# Patient Record
Sex: FEMALE | Race: WHITE | NOT HISPANIC OR LATINO | URBAN - METROPOLITAN AREA
[De-identification: names, ages, dates, MRNs, and addresses within clinical notes are randomized per-mention and may not be internally consistent; named-entity substitution may affect disease eponyms.]

---

## 2020-07-25 ENCOUNTER — TELEPHONE ENCOUNTER (OUTPATIENT)
Dept: URBAN - METROPOLITAN AREA CLINIC 13 | Facility: CLINIC | Age: 70
End: 2020-07-25

## 2020-07-25 RX ORDER — SUCRALFATE 1 G/1
TAKE 1 TABLET 4 TIMES DAILY BEFORE MEALS AND AT BEDTIME TABLET ORAL
Qty: 120 | Refills: 1 | OUTPATIENT
Start: 2012-05-16 | End: 2015-03-04

## 2020-07-25 RX ORDER — BUTALBITAL, ACETAMINOPHEN, AND CAFFEINE 50; 325; 40 MG/1; MG/1; MG/1
TAKE 1 TABLET 3 TIMES DAILY AS NEEDED FOR HEADACHE TABLET ORAL
Qty: 60 | Refills: 0 | OUTPATIENT
Start: 2011-10-05 | End: 2020-02-27

## 2020-07-25 RX ORDER — RAMIPRIL 5 MG/1
TAKE 1 CAPSULE ONCE DAILY CAPSULE ORAL
Refills: 0 | OUTPATIENT
Start: 2011-10-05 | End: 2015-03-04

## 2020-07-25 RX ORDER — ALLOPURINOL 300 MG/1
TAKE 1 TABLET DAILY TABLET ORAL
Refills: 0 | OUTPATIENT
Start: 2010-11-11 | End: 2015-03-04

## 2020-07-25 RX ORDER — POLYETHYLENE GLYCOL 3350, SODIUM SULFATE, SODIUM CHLORIDE, POTASSIUM CHLORIDE, ASCORBIC ACID, SODIUM ASCORBATE 7.5-2.691G
USE AS DIRECTED KIT ORAL
Qty: 1 | Refills: 0 | OUTPATIENT
Start: 2012-03-16 | End: 2012-04-02

## 2020-07-25 RX ORDER — AZITHROMYCIN DIHYDRATE 250 MG/1
TABLET, FILM COATED ORAL
Qty: 6 | Refills: 0 | OUTPATIENT
Start: 2012-03-14 | End: 2012-04-02

## 2020-07-25 RX ORDER — ESTRADIOL 1 MG/1
TAKE 1 TABLET DAILY TABLET ORAL
Refills: 0 | OUTPATIENT
Start: 2010-11-11 | End: 2020-01-28

## 2020-07-25 RX ORDER — METOPROLOL TARTRATE 50 MG/1
TAKE 1 TABLET EVERY 12 HOURS DAILY TABLET ORAL
Refills: 0 | OUTPATIENT
End: 2020-01-28

## 2020-07-25 RX ORDER — DEXLANSOPRAZOLE 60 MG/1
TAKE 1 CAPSULE DAILY CAPSULE, DELAYED RELEASE ORAL
Refills: 0 | OUTPATIENT
Start: 2012-02-13 | End: 2015-03-04

## 2020-07-25 RX ORDER — PIOGLITAZONE AND METFORMIN HYDROCHLORIDE 15; 850 MG/1; MG/1
TAKE 1 TABLET TWICE DAILY TABLET, FILM COATED ORAL
Refills: 0 | OUTPATIENT
Start: 2010-11-11 | End: 2015-03-04

## 2020-07-25 RX ORDER — FERROUS SULFATE 325(65) MG
TAKE 1 TABLET TWICE DAILY TABLET ORAL
Refills: 0 | OUTPATIENT
End: 2015-03-04

## 2020-07-25 RX ORDER — PRAVASTATIN SODIUM 20 MG/1
TAKE 2 TABLET DAILY TABLET ORAL
Refills: 0 | OUTPATIENT
Start: 2012-03-14 | End: 2020-01-28

## 2020-07-25 RX ORDER — SITAGLIPTIN PHOSPHATE 100 MG
TAKE 1 TABLET DAILY TABLET ORAL
Refills: 0 | OUTPATIENT
Start: 2010-11-11 | End: 2012-04-19

## 2020-07-26 ENCOUNTER — TELEPHONE ENCOUNTER (OUTPATIENT)
Dept: URBAN - METROPOLITAN AREA CLINIC 13 | Facility: CLINIC | Age: 70
End: 2020-07-26

## 2020-07-26 RX ORDER — INSULIN GLARGINE 100 [IU]/ML
INJ 35 UNITS MORNING AND 25 UNITS BEDTIME INJECTION, SOLUTION SUBCUTANEOUS
Qty: 54 | Refills: 0 | Status: ACTIVE | COMMUNITY
Start: 2019-06-18

## 2020-07-26 RX ORDER — TAMOXIFEN CITRATE 10 MG/1
TAKE 1 2 (ONE HALF) TABLET BY MOUTH ONCE DAILY TABLET, FILM COATED ORAL
Qty: 45 | Refills: 0 | Status: ACTIVE | COMMUNITY
Start: 2019-04-19

## 2020-07-26 RX ORDER — LISINOPRIL 10 MG/1
TAKE 1 TABLET BY MOUTH EVERY DAY TABLET ORAL
Qty: 30 | Refills: 0 | Status: ACTIVE | COMMUNITY
Start: 2019-04-17

## 2020-07-26 RX ORDER — ESTERIFIED ESTROGENS AND METHYLTESTOSTERONE 1.25; 2.5 MG/1; MG/1
TABLET ORAL
Qty: 90 | Refills: 0 | Status: ACTIVE | COMMUNITY
Start: 2011-10-05

## 2020-07-26 RX ORDER — FENOFIBRATE 54 MG/1
1 TABLET WITH A MEAL ONCE A DAY ORALLY 30 TABLET ORAL
Qty: 30 | Refills: 0 | Status: ACTIVE | COMMUNITY
Start: 2019-02-13

## 2020-07-26 RX ORDER — DOCUSATE SODIUM 100 MG/1
TAKE 1 CAPSULE BY MOUTH DAILY CAPSULE, LIQUID FILLED ORAL
Qty: 10 | Refills: 0 | Status: ACTIVE | COMMUNITY
Start: 2019-02-20

## 2020-07-26 RX ORDER — ROSUVASTATIN CALCIUM 10 MG/1
TAKE 1 TABLET BY MOUTH EVERY DAY TABLET, FILM COATED ORAL
Qty: 90 | Refills: 0 | Status: ACTIVE | COMMUNITY
Start: 2019-01-16

## 2020-07-26 RX ORDER — DULAGLUTIDE 0.75 MG/.5ML
INJECT 1 PEN ONCE PER WEEK INJECTION, SOLUTION SUBCUTANEOUS
Qty: 4 | Refills: 0 | Status: ACTIVE | COMMUNITY
Start: 2019-06-11

## 2020-07-26 RX ORDER — MECLIZINE HYDROCHLORIDE 25 MG/1
TAKE 1 TABLET EVERY 8 TO 12 HOURS AS NEEDED TABLET ORAL
Qty: 45 | Refills: 1 | Status: ACTIVE | COMMUNITY
Start: 2015-04-03

## 2020-07-26 RX ORDER — DESVENLAFAXINE SUCCINATE 50 MG
TABLET, EXTENDED RELEASE 24 HR ORAL
Qty: 30 | Refills: 0 | Status: ACTIVE | COMMUNITY
Start: 2011-09-21

## 2020-07-26 RX ORDER — SODIUM BICARBONATE 650 MG/1
TK 1 T PO  BID TABLET ORAL
Qty: 60 | Refills: 0 | Status: ACTIVE | COMMUNITY
Start: 2019-11-06

## 2020-07-26 RX ORDER — CITALOPRAM 20 MG/1
TAKE ONE TABLET BY MOUTH EVERY DAY AS DIRECTED TABLET ORAL
Refills: 0 | Status: ACTIVE | COMMUNITY
Start: 2019-03-15

## 2020-07-26 RX ORDER — ONDANSETRON HYDROCHLORIDE 4 MG/1
TAKE 1 TABLET BY MOUTH EVERY 8 HOURS AS NEEDED TABLET, FILM COATED ORAL
Qty: 10 | Refills: 0 | Status: ACTIVE | COMMUNITY
Start: 2019-02-20

## 2020-07-26 RX ORDER — ALLOPURINOL 100 MG/1
TAKE 1 TABLET DAILY TABLET ORAL
Refills: 0 | Status: ACTIVE | COMMUNITY

## 2020-07-26 RX ORDER — INSULIN LISPRO 200 [IU]/ML
INJECT 15 UNITS BEFORE MEALS AND AS NEEDED UP TO 60 UNITS PER DAY INJECTION, SOLUTION SUBCUTANEOUS
Qty: 30 | Refills: 0 | Status: ACTIVE | COMMUNITY
Start: 2018-05-21

## 2020-07-26 RX ORDER — GLIMEPIRIDE 2 MG/1
TAKE 1 TABLET TWICE DAILY TABLET ORAL
Refills: 0 | Status: ACTIVE | COMMUNITY
Start: 2010-11-11

## 2020-07-26 RX ORDER — INSULIN GLARGINE 100 [IU]/ML
INJECT 30 UNITS IN THE MORNING AND 15 UNITS BEFORE BEDTIME INJECTION, SOLUTION SUBCUTANEOUS
Qty: 45 | Refills: 0 | Status: ACTIVE | COMMUNITY
Start: 2018-05-21

## 2020-07-26 RX ORDER — FLUCONAZOLE 150 MG/1
TABLET ORAL
Qty: 3 | Refills: 0 | Status: ACTIVE | COMMUNITY
Start: 2011-06-14

## 2020-07-26 RX ORDER — MUPIROCIN 20 MG/G
APPLY TO AFFECTED AREA 3 TIMES A DAY FOR 5 DAYS OINTMENT TOPICAL
Qty: 22 | Refills: 0 | Status: ACTIVE | COMMUNITY
Start: 2019-01-24

## 2020-07-26 RX ORDER — OXYCODONE AND ACETAMINOPHEN 5; 325 MG/1; MG/1
TAKE 1 TABLET BY MOUTH EVERY 4 HOURS AS NEEDED TABLET ORAL
Qty: 15 | Refills: 0 | Status: ACTIVE | COMMUNITY
Start: 2019-02-20

## 2020-07-26 RX ORDER — DULAGLUTIDE 0.75 MG/.5ML
1 (ONE) SOLUTION PEN-INJECTOR ONCE A WEEK INJECTION, SOLUTION SUBCUTANEOUS
Qty: 4 | Refills: 0 | Status: ACTIVE | COMMUNITY
Start: 2019-04-17

## 2020-07-26 RX ORDER — ESOMEPRAZOLE MAGNESIUM 40 MG
TAKE 1 CAPSULE DAILY CAPSULE,DELAYED RELEASE (ENTERIC COATED) ORAL
Refills: 0 | Status: ACTIVE | COMMUNITY

## 2020-07-26 RX ORDER — HYDROXYZINE HYDROCHLORIDE 25 MG/1
1 TABLET AS NEEDED FOR ANXIETY AND AT NIGHT FOR SLEEP EVERY 8 HOURS TABLET, FILM COATED ORAL
Qty: 90 | Refills: 0 | Status: ACTIVE | COMMUNITY
Start: 2019-04-18

## 2020-07-26 RX ORDER — TIZANIDINE HYDROCHLORIDE 4 MG/1
TAKE 1 CAPSULE BY MOUTH AT BEDTIME CAPSULE ORAL
Qty: 90 | Refills: 0 | Status: ACTIVE | COMMUNITY
Start: 2019-11-20

## 2020-07-26 RX ORDER — INSULIN ASPART 100 [IU]/ML
INJECT 13 UNIT 3 TIMES DAILY INJECTION, SOLUTION INTRAVENOUS; SUBCUTANEOUS
Refills: 0 | Status: ACTIVE | COMMUNITY

## 2020-07-26 RX ORDER — ALENDRONATE SODIUM 70 MG/1
TAKE 1 TABLET BY MOUTH WEEKLY TABLET ORAL
Qty: 4 | Refills: 0 | Status: ACTIVE | COMMUNITY
Start: 2019-02-12

## 2020-07-26 RX ORDER — NYSTATIN 100000 [USP'U]/G
APPLY TO AFFECTED AREA TWICE A DAY FOR 10 DAYS CREAM TOPICAL
Qty: 15 | Refills: 0 | Status: ACTIVE | COMMUNITY
Start: 2019-04-08

## 2023-12-27 ENCOUNTER — LAB OUTSIDE AN ENCOUNTER (OUTPATIENT)
Dept: URBAN - METROPOLITAN AREA CLINIC 113 | Facility: CLINIC | Age: 73
End: 2023-12-27

## 2023-12-27 ENCOUNTER — OFFICE VISIT (OUTPATIENT)
Dept: URBAN - METROPOLITAN AREA CLINIC 113 | Facility: CLINIC | Age: 73
End: 2023-12-27
Payer: COMMERCIAL

## 2023-12-27 ENCOUNTER — DASHBOARD ENCOUNTERS (OUTPATIENT)
Age: 73
End: 2023-12-27

## 2023-12-27 VITALS
BODY MASS INDEX: 37.91 KG/M2 | RESPIRATION RATE: 16 BRPM | HEART RATE: 80 BPM | WEIGHT: 206 LBS | TEMPERATURE: 97.4 F | HEIGHT: 62 IN | SYSTOLIC BLOOD PRESSURE: 177 MMHG | DIASTOLIC BLOOD PRESSURE: 82 MMHG

## 2023-12-27 DIAGNOSIS — R79.89 ELEVATED LFTS: ICD-10-CM

## 2023-12-27 DIAGNOSIS — R19.5 POSITIVE COLORECTAL CANCER SCREENING USING COLOGUARD TEST: ICD-10-CM

## 2023-12-27 PROCEDURE — 99204 OFFICE O/P NEW MOD 45 MIN: CPT | Performed by: INTERNAL MEDICINE

## 2023-12-27 RX ORDER — ONDANSETRON HYDROCHLORIDE 4 MG/1
TAKE 1 TABLET BY MOUTH EVERY 8 HOURS AS NEEDED TABLET, FILM COATED ORAL
Qty: 10 | Refills: 0 | Status: ACTIVE | COMMUNITY
Start: 2019-02-20

## 2023-12-27 RX ORDER — FLUCONAZOLE 150 MG/1
TABLET ORAL
Qty: 3 | Refills: 0 | Status: ACTIVE | COMMUNITY
Start: 2011-06-14

## 2023-12-27 RX ORDER — INSULIN GLARGINE 100 [IU]/ML
INJECT 30 UNITS IN THE MORNING AND 15 UNITS BEFORE BEDTIME INJECTION, SOLUTION SUBCUTANEOUS
Qty: 45 | Refills: 0 | Status: ACTIVE | COMMUNITY
Start: 2018-05-21

## 2023-12-27 RX ORDER — ROSUVASTATIN CALCIUM 10 MG/1
TAKE 1 TABLET BY MOUTH EVERY DAY TABLET, FILM COATED ORAL
Qty: 90 | Refills: 0 | Status: ON HOLD | COMMUNITY
Start: 2019-01-16

## 2023-12-27 RX ORDER — ESTERIFIED ESTROGENS AND METHYLTESTOSTERONE 1.25; 2.5 MG/1; MG/1
TABLET ORAL
Qty: 90 | Refills: 0 | Status: ON HOLD | COMMUNITY
Start: 2011-10-05

## 2023-12-27 RX ORDER — DESVENLAFAXINE SUCCINATE 50 MG
TABLET, EXTENDED RELEASE 24 HR ORAL
Qty: 30 | Refills: 0 | Status: ON HOLD | COMMUNITY
Start: 2011-09-21

## 2023-12-27 RX ORDER — PANTOPRAZOLE SODIUM 20 MG/1
1 TABLET TABLET, DELAYED RELEASE ORAL ONCE A DAY
Status: ACTIVE | COMMUNITY

## 2023-12-27 RX ORDER — TIZANIDINE HYDROCHLORIDE 4 MG/1
TAKE 1 CAPSULE BY MOUTH AT BEDTIME CAPSULE ORAL
Qty: 90 | Refills: 0 | Status: ACTIVE | COMMUNITY
Start: 2019-11-20

## 2023-12-27 RX ORDER — FLUOXETINE HYDROCHLORIDE 10 MG/1
1 CAPSULE CAPSULE ORAL ONCE A DAY
Status: ACTIVE | COMMUNITY

## 2023-12-27 RX ORDER — MUPIROCIN 20 MG/G
APPLY TO AFFECTED AREA 3 TIMES A DAY FOR 5 DAYS OINTMENT TOPICAL
Qty: 22 | Refills: 0 | Status: ACTIVE | COMMUNITY
Start: 2019-01-24

## 2023-12-27 RX ORDER — SODIUM BICARBONATE 650 MG/1
TK 1 T PO  BID TABLET ORAL
Qty: 60 | Refills: 0 | Status: ACTIVE | COMMUNITY
Start: 2019-11-06

## 2023-12-27 RX ORDER — ALLOPURINOL 100 MG/1
TAKE 1 TABLET DAILY TABLET ORAL
Refills: 0 | Status: ACTIVE | COMMUNITY

## 2023-12-27 RX ORDER — INSULIN LISPRO 200 [IU]/ML
INJECT 15 UNITS BEFORE MEALS AND AS NEEDED UP TO 60 UNITS PER DAY INJECTION, SOLUTION SUBCUTANEOUS
Qty: 30 | Refills: 0 | Status: ACTIVE | COMMUNITY
Start: 2018-05-21

## 2023-12-27 RX ORDER — CITALOPRAM 20 MG/1
TAKE ONE TABLET BY MOUTH EVERY DAY AS DIRECTED TABLET ORAL
Refills: 0 | Status: ON HOLD | COMMUNITY
Start: 2019-03-15

## 2023-12-27 RX ORDER — BUTALBITAL AND ACETAMINOPHEN 50; 325 MG/1; MG/1
1 TABLET AS NEEDED TABLET ORAL
Status: ACTIVE | COMMUNITY

## 2023-12-27 RX ORDER — TAMOXIFEN CITRATE 10 MG/1
TAKE 1 2 (ONE HALF) TABLET BY MOUTH ONCE DAILY TABLET, FILM COATED ORAL
Qty: 45 | Refills: 0 | Status: ON HOLD | COMMUNITY
Start: 2019-04-19

## 2023-12-27 RX ORDER — DOCUSATE SODIUM 100 MG/1
TAKE 1 CAPSULE BY MOUTH DAILY CAPSULE, LIQUID FILLED ORAL
Qty: 10 | Refills: 0 | Status: ACTIVE | COMMUNITY
Start: 2019-02-20

## 2023-12-27 RX ORDER — GLIMEPIRIDE 2 MG/1
TAKE 1 TABLET TWICE DAILY TABLET ORAL
Refills: 0 | Status: ACTIVE | COMMUNITY
Start: 2010-11-11

## 2023-12-27 RX ORDER — NYSTATIN 100000 [USP'U]/G
APPLY TO AFFECTED AREA TWICE A DAY FOR 10 DAYS CREAM TOPICAL
Qty: 15 | Refills: 0 | Status: ACTIVE | COMMUNITY
Start: 2019-04-08

## 2023-12-27 RX ORDER — MECLIZINE HYDROCHLORIDE 25 MG/1
TAKE 1 TABLET EVERY 8 TO 12 HOURS AS NEEDED TABLET ORAL
Qty: 45 | Refills: 1 | Status: ACTIVE | COMMUNITY
Start: 2015-04-03

## 2023-12-27 RX ORDER — INSULIN ASPART 100 [IU]/ML
INJECT 13 UNIT 3 TIMES DAILY INJECTION, SOLUTION INTRAVENOUS; SUBCUTANEOUS
Refills: 0 | Status: ON HOLD | COMMUNITY

## 2023-12-27 RX ORDER — LISINOPRIL 10 MG/1
TAKE 1 TABLET BY MOUTH EVERY DAY TABLET ORAL
Qty: 30 | Refills: 0 | Status: ON HOLD | COMMUNITY
Start: 2019-04-17

## 2023-12-27 RX ORDER — DULAGLUTIDE 0.75 MG/.5ML
1 (ONE) SOLUTION PEN-INJECTOR ONCE A WEEK INJECTION, SOLUTION SUBCUTANEOUS
Qty: 4 | Refills: 0 | Status: ON HOLD | COMMUNITY
Start: 2019-04-17

## 2023-12-27 RX ORDER — BUTALBITAL, ACETAMINOPHEN AND CAFFEINE 50; 325; 40 MG/1; MG/1; MG/1
1 CAPSULE AS NEEDED CAPSULE ORAL
Status: ACTIVE | COMMUNITY

## 2023-12-27 RX ORDER — ESOMEPRAZOLE MAGNESIUM 40 MG
TAKE 1 CAPSULE DAILY CAPSULE,DELAYED RELEASE (ENTERIC COATED) ORAL
Refills: 0 | Status: ON HOLD | COMMUNITY

## 2023-12-27 RX ORDER — HYDROXYZINE HYDROCHLORIDE 25 MG/1
1 TABLET AS NEEDED FOR ANXIETY AND AT NIGHT FOR SLEEP EVERY 8 HOURS TABLET, FILM COATED ORAL
Qty: 90 | Refills: 0 | Status: ACTIVE | COMMUNITY
Start: 2019-04-18

## 2023-12-27 RX ORDER — ALENDRONATE SODIUM 70 MG/1
TAKE 1 TABLET BY MOUTH WEEKLY TABLET ORAL
Qty: 4 | Refills: 0 | Status: ON HOLD | COMMUNITY
Start: 2019-02-12

## 2023-12-27 RX ORDER — OXYCODONE AND ACETAMINOPHEN 5; 325 MG/1; MG/1
TAKE 1 TABLET BY MOUTH EVERY 4 HOURS AS NEEDED TABLET ORAL
Qty: 15 | Refills: 0 | Status: ACTIVE | COMMUNITY
Start: 2019-02-20

## 2023-12-27 RX ORDER — FENOFIBRATE 54 MG/1
1 TABLET WITH A MEAL ONCE A DAY ORALLY 30 TABLET ORAL
Qty: 30 | Refills: 0 | Status: ACTIVE | COMMUNITY
Start: 2019-02-13

## 2023-12-27 NOTE — HPI-TODAY'S VISIT:
Patient presents today for evaluation.  She had a positive Cologuard test recently.  Her last colonoscopy with me was in 2012.  She reports she had a Cologuard stool test done several years ago that was negative.  She had a recent 1 done that was positive.  She does note that she had some bleeding maybe 10 or 14 days prior to that test.  This occurred after a trip to [place].  May be some straining around that time.  There is a question of a rectocele by her report from GYN.  She is moving her bowels every day currently.  No abdominal pain.  No nausea or vomiting.  No weight loss. She also has a history of elevated LFTs thought to be related to fatty liver.  Previous hepatitis B and C testing was negative.  She was total hepatitis A antibody positive.  She is never had an ultrasound of her liver.

## 2023-12-28 LAB
A/G RATIO: 1.5
ABSOLUTE BASOPHILS: 28
ABSOLUTE EOSINOPHILS: 150
ABSOLUTE LYMPHOCYTES: 1941
ABSOLUTE MONOCYTES: 338
ABSOLUTE NEUTROPHILS: 2242
ALBUMIN: 4
ALKALINE PHOSPHATASE: 85
ALT (SGPT): 14
AST (SGOT): 26
BASOPHILS: 0.6
BILIRUBIN, TOTAL: 0.2
BUN/CREATININE RATIO: 9
BUN: 13
CALCIUM: 9
CARBON DIOXIDE, TOTAL: 29
CHLORIDE: 105
CREATININE: 1.49
EGFR: 36
EOSINOPHILS: 3.2
GLOBULIN, TOTAL: 2.7
GLUCOSE: 127
HEMATOCRIT: 36.1
HEMOGLOBIN: 10.7
LYMPHOCYTES: 41.3
MCH: 22.5
MCHC: 29.6
MCV: 76
MONOCYTES: 7.2
MPV: 12
NEUTROPHILS: 47.7
PLATELET COUNT: 230
POTASSIUM: 4.6
PROTEIN, TOTAL: 6.7
RDW: 15.5
RED BLOOD CELL COUNT: 4.75
SODIUM: 142
WHITE BLOOD CELL COUNT: 4.7

## 2024-01-05 ENCOUNTER — TELEPHONE ENCOUNTER (OUTPATIENT)
Dept: URBAN - METROPOLITAN AREA CLINIC 113 | Facility: CLINIC | Age: 74
End: 2024-01-05

## 2024-01-31 ENCOUNTER — OFFICE VISIT (OUTPATIENT)
Dept: URBAN - METROPOLITAN AREA SURGERY CENTER 25 | Facility: SURGERY CENTER | Age: 74
End: 2024-01-31

## 2024-03-20 ENCOUNTER — COLON (OUTPATIENT)
Dept: URBAN - METROPOLITAN AREA SURGERY CENTER 25 | Facility: SURGERY CENTER | Age: 74
End: 2024-03-20
Payer: COMMERCIAL

## 2024-03-20 ENCOUNTER — LAB (OUTPATIENT)
Dept: URBAN - METROPOLITAN AREA CLINIC 4 | Facility: CLINIC | Age: 74
End: 2024-03-20
Payer: COMMERCIAL

## 2024-03-20 DIAGNOSIS — Z12.11 COLON CANCER SCREENING: ICD-10-CM

## 2024-03-20 DIAGNOSIS — D12.4 ADENOMA OF DESCENDING COLON: ICD-10-CM

## 2024-03-20 DIAGNOSIS — D12.2 ADENOMA OF ASCENDING COLON: ICD-10-CM

## 2024-03-20 DIAGNOSIS — D12.3 ADENOMA OF TRANSVERSE COLON: ICD-10-CM

## 2024-03-20 DIAGNOSIS — D12.2 BENIGN NEOPLASM OF ASCENDING COLON: ICD-10-CM

## 2024-03-20 DIAGNOSIS — R19.5 ABNORMAL CONSISTENCY OF STOOL: ICD-10-CM

## 2024-03-20 PROCEDURE — 88305 TISSUE EXAM BY PATHOLOGIST: CPT | Performed by: PATHOLOGY

## 2024-03-20 PROCEDURE — 45385 COLONOSCOPY W/LESION REMOVAL: CPT | Performed by: INTERNAL MEDICINE

## 2024-03-20 RX ORDER — FLUOXETINE HYDROCHLORIDE 10 MG/1
1 CAPSULE CAPSULE ORAL ONCE A DAY
Status: ACTIVE | COMMUNITY

## 2024-03-20 RX ORDER — ALLOPURINOL 100 MG/1
TAKE 1 TABLET DAILY TABLET ORAL
Refills: 0 | Status: ACTIVE | COMMUNITY

## 2024-03-20 RX ORDER — FENOFIBRATE 54 MG/1
1 TABLET WITH A MEAL ONCE A DAY ORALLY 30 TABLET ORAL
Qty: 30 | Refills: 0 | Status: ACTIVE | COMMUNITY
Start: 2019-02-13

## 2024-03-20 RX ORDER — DESVENLAFAXINE SUCCINATE 50 MG/1
TABLET, EXTENDED RELEASE ORAL
Qty: 30 | Refills: 0 | Status: ON HOLD | COMMUNITY
Start: 2011-09-21

## 2024-03-20 RX ORDER — INSULIN ASPART 100 [IU]/ML
INJECT 13 UNIT 3 TIMES DAILY INJECTION, SOLUTION INTRAVENOUS; SUBCUTANEOUS
Refills: 0 | Status: ON HOLD | COMMUNITY

## 2024-03-20 RX ORDER — ALENDRONATE SODIUM 70 MG/1
TAKE 1 TABLET BY MOUTH WEEKLY TABLET ORAL
Qty: 4 | Refills: 0 | Status: ON HOLD | COMMUNITY
Start: 2019-02-12

## 2024-03-20 RX ORDER — PANTOPRAZOLE SODIUM 20 MG/1
1 TABLET TABLET, DELAYED RELEASE ORAL ONCE A DAY
Status: ACTIVE | COMMUNITY

## 2024-03-20 RX ORDER — ESTERIFIED ESTROGENS AND METHYLTESTOSTERONE 1.25; 2.5 MG/1; MG/1
TABLET ORAL
Qty: 90 | Refills: 0 | Status: ON HOLD | COMMUNITY
Start: 2011-10-05

## 2024-03-20 RX ORDER — HYDROXYZINE HYDROCHLORIDE 25 MG/1
1 TABLET AS NEEDED FOR ANXIETY AND AT NIGHT FOR SLEEP EVERY 8 HOURS TABLET, FILM COATED ORAL
Qty: 90 | Refills: 0 | Status: ACTIVE | COMMUNITY
Start: 2019-04-18

## 2024-03-20 RX ORDER — FLUCONAZOLE 150 MG/1
TABLET ORAL
Qty: 3 | Refills: 0 | Status: ACTIVE | COMMUNITY
Start: 2011-06-14

## 2024-03-20 RX ORDER — BUTALBITAL AND ACETAMINOPHEN 50; 325 MG/1; MG/1
1 TABLET AS NEEDED TABLET ORAL
Status: ACTIVE | COMMUNITY

## 2024-03-20 RX ORDER — INSULIN GLARGINE 100 [IU]/ML
INJECT 30 UNITS IN THE MORNING AND 15 UNITS BEFORE BEDTIME INJECTION, SOLUTION SUBCUTANEOUS
Qty: 45 | Refills: 0 | Status: ACTIVE | COMMUNITY
Start: 2018-05-21

## 2024-03-20 RX ORDER — CITALOPRAM 20 MG/1
TAKE ONE TABLET BY MOUTH EVERY DAY AS DIRECTED TABLET ORAL
Refills: 0 | Status: ON HOLD | COMMUNITY
Start: 2019-03-15

## 2024-03-20 RX ORDER — TIZANIDINE HYDROCHLORIDE 4 MG/1
TAKE 1 CAPSULE BY MOUTH AT BEDTIME CAPSULE ORAL
Qty: 90 | Refills: 0 | Status: ACTIVE | COMMUNITY
Start: 2019-11-20

## 2024-03-20 RX ORDER — DULAGLUTIDE 0.75 MG/.5ML
1 (ONE) SOLUTION PEN-INJECTOR ONCE A WEEK INJECTION, SOLUTION SUBCUTANEOUS
Qty: 4 | Refills: 0 | Status: ON HOLD | COMMUNITY
Start: 2019-04-17

## 2024-03-20 RX ORDER — INSULIN LISPRO 200 [IU]/ML
INJECT 15 UNITS BEFORE MEALS AND AS NEEDED UP TO 60 UNITS PER DAY INJECTION, SOLUTION SUBCUTANEOUS
Qty: 30 | Refills: 0 | Status: ACTIVE | COMMUNITY
Start: 2018-05-21

## 2024-03-20 RX ORDER — LISINOPRIL 10 MG/1
TAKE 1 TABLET BY MOUTH EVERY DAY TABLET ORAL
Qty: 30 | Refills: 0 | Status: ON HOLD | COMMUNITY
Start: 2019-04-17

## 2024-03-20 RX ORDER — DOCUSATE SODIUM 100 MG/1
TAKE 1 CAPSULE BY MOUTH DAILY CAPSULE, LIQUID FILLED ORAL
Qty: 10 | Refills: 0 | Status: ACTIVE | COMMUNITY
Start: 2019-02-20

## 2024-03-20 RX ORDER — GLIMEPIRIDE 2 MG/1
TAKE 1 TABLET TWICE DAILY TABLET ORAL
Refills: 0 | Status: ACTIVE | COMMUNITY
Start: 2010-11-11

## 2024-03-20 RX ORDER — ONDANSETRON HYDROCHLORIDE 4 MG/1
TAKE 1 TABLET BY MOUTH EVERY 8 HOURS AS NEEDED TABLET, FILM COATED ORAL
Qty: 10 | Refills: 0 | Status: ACTIVE | COMMUNITY
Start: 2019-02-20

## 2024-03-20 RX ORDER — ROSUVASTATIN CALCIUM 10 MG/1
TAKE 1 TABLET BY MOUTH EVERY DAY TABLET, FILM COATED ORAL
Qty: 90 | Refills: 0 | Status: ON HOLD | COMMUNITY
Start: 2019-01-16

## 2024-03-20 RX ORDER — TAMOXIFEN CITRATE 10 MG/1
TAKE 1 2 (ONE HALF) TABLET BY MOUTH ONCE DAILY TABLET, FILM COATED ORAL
Qty: 45 | Refills: 0 | Status: ON HOLD | COMMUNITY
Start: 2019-04-19

## 2024-03-20 RX ORDER — MECLIZINE HYDROCHLORIDE 25 MG/1
TAKE 1 TABLET EVERY 8 TO 12 HOURS AS NEEDED TABLET ORAL
Qty: 45 | Refills: 1 | Status: ACTIVE | COMMUNITY
Start: 2015-04-03

## 2024-03-20 RX ORDER — SODIUM BICARBONATE 650 MG/1
TK 1 T PO  BID TABLET ORAL
Qty: 60 | Refills: 0 | Status: ACTIVE | COMMUNITY
Start: 2019-11-06

## 2024-03-20 RX ORDER — NYSTATIN 100000 [USP'U]/G
APPLY TO AFFECTED AREA TWICE A DAY FOR 10 DAYS CREAM TOPICAL
Qty: 15 | Refills: 0 | Status: ACTIVE | COMMUNITY
Start: 2019-04-08

## 2024-03-20 RX ORDER — MUPIROCIN 20 MG/G
APPLY TO AFFECTED AREA 3 TIMES A DAY FOR 5 DAYS OINTMENT TOPICAL
Qty: 22 | Refills: 0 | Status: ACTIVE | COMMUNITY
Start: 2019-01-24

## 2024-03-20 RX ORDER — ESOMEPRAZOLE MAGNESIUM 40 MG
TAKE 1 CAPSULE DAILY CAPSULE,DELAYED RELEASE (ENTERIC COATED) ORAL
Refills: 0 | Status: ON HOLD | COMMUNITY

## 2024-03-20 RX ORDER — BUTALBITAL, ACETAMINOPHEN AND CAFFEINE 50; 325; 40 MG/1; MG/1; MG/1
1 CAPSULE AS NEEDED CAPSULE ORAL
Status: ACTIVE | COMMUNITY

## 2024-03-20 RX ORDER — OXYCODONE AND ACETAMINOPHEN 5; 325 MG/1; MG/1
TAKE 1 TABLET BY MOUTH EVERY 4 HOURS AS NEEDED TABLET ORAL
Qty: 15 | Refills: 0 | Status: ACTIVE | COMMUNITY
Start: 2019-02-20